# Patient Record
Sex: MALE | Race: WHITE | Employment: UNEMPLOYED | URBAN - METROPOLITAN AREA
[De-identification: names, ages, dates, MRNs, and addresses within clinical notes are randomized per-mention and may not be internally consistent; named-entity substitution may affect disease eponyms.]

---

## 2017-05-11 ENCOUNTER — HOSPITAL ENCOUNTER (EMERGENCY)
Age: 47
Discharge: HOME | End: 2017-05-12
Attending: EMERGENCY MEDICINE
Payer: COMMERCIAL

## 2017-05-11 ENCOUNTER — APPOINTMENT (OUTPATIENT)
Dept: GENERAL RADIOLOGY | Age: 47
End: 2017-05-11
Attending: EMERGENCY MEDICINE
Payer: COMMERCIAL

## 2017-05-11 DIAGNOSIS — R07.89 ATYPICAL CHEST PAIN: Primary | ICD-10-CM

## 2017-05-11 DIAGNOSIS — J42 CHRONIC BRONCHITIS, UNSPECIFIED CHRONIC BRONCHITIS TYPE (HCC): ICD-10-CM

## 2017-05-11 LAB
ANION GAP BLD CALC-SCNC: 8 MMOL/L (ref 7–16)
BASOPHILS # BLD AUTO: 0 K/UL (ref 0–0.2)
BASOPHILS # BLD: 0 % (ref 0–2)
BUN SERPL-MCNC: 12 MG/DL (ref 6–23)
CALCIUM SERPL-MCNC: 8.7 MG/DL (ref 8.3–10.4)
CHLORIDE SERPL-SCNC: 108 MMOL/L (ref 98–107)
CO2 SERPL-SCNC: 27 MMOL/L (ref 21–32)
CREAT SERPL-MCNC: 0.96 MG/DL (ref 0.8–1.5)
D DIMER PPP FEU-MCNC: 0.29 UG/ML(FEU)
DIFFERENTIAL METHOD BLD: ABNORMAL
EOSINOPHIL # BLD: 0.3 K/UL (ref 0–0.8)
EOSINOPHIL NFR BLD: 4 % (ref 0.5–7.8)
ERYTHROCYTE [DISTWIDTH] IN BLOOD BY AUTOMATED COUNT: 12.6 % (ref 11.9–14.6)
GLUCOSE SERPL-MCNC: 106 MG/DL (ref 65–100)
HCT VFR BLD AUTO: 41.5 % (ref 41.1–50.3)
HGB BLD-MCNC: 14.7 G/DL (ref 13.6–17.2)
IMM GRANULOCYTES # BLD: 0 K/UL (ref 0–0.5)
IMM GRANULOCYTES NFR BLD AUTO: 0.1 % (ref 0–5)
LYMPHOCYTES # BLD AUTO: 29 % (ref 13–44)
LYMPHOCYTES # BLD: 2.3 K/UL (ref 0.5–4.6)
MCH RBC QN AUTO: 32.2 PG (ref 26.1–32.9)
MCHC RBC AUTO-ENTMCNC: 35.4 G/DL (ref 31.4–35)
MCV RBC AUTO: 90.8 FL (ref 79.6–97.8)
MONOCYTES # BLD: 0.5 K/UL (ref 0.1–1.3)
MONOCYTES NFR BLD AUTO: 7 % (ref 4–12)
NEUTS SEG # BLD: 4.7 K/UL (ref 1.7–8.2)
NEUTS SEG NFR BLD AUTO: 60 % (ref 43–78)
PLATELET # BLD AUTO: 281 K/UL (ref 150–450)
PMV BLD AUTO: 9 FL (ref 10.8–14.1)
POTASSIUM SERPL-SCNC: 3.6 MMOL/L (ref 3.5–5.1)
RBC # BLD AUTO: 4.57 M/UL (ref 4.23–5.67)
SODIUM SERPL-SCNC: 143 MMOL/L (ref 136–145)
TROPONIN I SERPL-MCNC: <0.02 NG/ML (ref 0.02–0.05)
WBC # BLD AUTO: 7.9 K/UL (ref 4.3–11.1)

## 2017-05-11 PROCEDURE — 99285 EMERGENCY DEPT VISIT HI MDM: CPT | Performed by: EMERGENCY MEDICINE

## 2017-05-11 PROCEDURE — 80048 BASIC METABOLIC PNL TOTAL CA: CPT | Performed by: EMERGENCY MEDICINE

## 2017-05-11 PROCEDURE — 71020 XR CHEST PA LAT: CPT

## 2017-05-11 PROCEDURE — 74011250636 HC RX REV CODE- 250/636: Performed by: EMERGENCY MEDICINE

## 2017-05-11 PROCEDURE — 93005 ELECTROCARDIOGRAM TRACING: CPT | Performed by: EMERGENCY MEDICINE

## 2017-05-11 PROCEDURE — 74011250637 HC RX REV CODE- 250/637: Performed by: EMERGENCY MEDICINE

## 2017-05-11 PROCEDURE — 85025 COMPLETE CBC W/AUTO DIFF WBC: CPT | Performed by: EMERGENCY MEDICINE

## 2017-05-11 PROCEDURE — 85379 FIBRIN DEGRADATION QUANT: CPT | Performed by: EMERGENCY MEDICINE

## 2017-05-11 PROCEDURE — 84484 ASSAY OF TROPONIN QUANT: CPT | Performed by: EMERGENCY MEDICINE

## 2017-05-11 PROCEDURE — 84484 ASSAY OF TROPONIN QUANT: CPT

## 2017-05-11 PROCEDURE — 96360 HYDRATION IV INFUSION INIT: CPT | Performed by: EMERGENCY MEDICINE

## 2017-05-11 RX ORDER — GUAIFENESIN 100 MG/5ML
162 LIQUID (ML) ORAL
Status: COMPLETED | OUTPATIENT
Start: 2017-05-11 | End: 2017-05-11

## 2017-05-11 RX ORDER — NITROGLYCERIN 0.4 MG/1
0.4 TABLET SUBLINGUAL
Status: DISCONTINUED | OUTPATIENT
Start: 2017-05-11 | End: 2017-05-12 | Stop reason: HOSPADM

## 2017-05-11 RX ADMIN — SODIUM CHLORIDE 1000 ML: 900 INJECTION, SOLUTION INTRAVENOUS at 22:47

## 2017-05-11 RX ADMIN — NITROGLYCERIN 0.4 MG: 0.4 TABLET SUBLINGUAL at 23:10

## 2017-05-11 RX ADMIN — NITROGLYCERIN 0.4 MG: 0.4 TABLET SUBLINGUAL at 23:15

## 2017-05-11 RX ADMIN — ASPIRIN 81 MG 162 MG: 81 TABLET ORAL at 22:47

## 2017-05-11 NOTE — LETTER
[address] [facility] EMERGENCY DEPT One [address] [address] [phone] Work/School Note Date: 5/11/2017 To Whom It May concern: 
 
Missy Kristian was seen and treated today in the emergency room by the following provider(s): 
No providers found. Missy Townsend. Sincerely, [facility] ED

## 2017-05-12 VITALS
BODY MASS INDEX: 31.99 KG/M2 | DIASTOLIC BLOOD PRESSURE: 68 MMHG | SYSTOLIC BLOOD PRESSURE: 124 MMHG | RESPIRATION RATE: 16 BRPM | HEIGHT: 65 IN | OXYGEN SATURATION: 96 % | TEMPERATURE: 98.1 F | WEIGHT: 192 LBS | HEART RATE: 61 BPM

## 2017-05-12 LAB
ATRIAL RATE: 62 BPM
ATRIAL RATE: 63 BPM
CALCULATED P AXIS, ECG09: 27 DEGREES
CALCULATED P AXIS, ECG09: 67 DEGREES
CALCULATED R AXIS, ECG10: 19 DEGREES
CALCULATED R AXIS, ECG10: 38 DEGREES
CALCULATED T AXIS, ECG11: -3 DEGREES
CALCULATED T AXIS, ECG11: 20 DEGREES
DIAGNOSIS, 93000: NORMAL
DIAGNOSIS, 93000: NORMAL
P-R INTERVAL, ECG05: 156 MS
P-R INTERVAL, ECG05: 160 MS
Q-T INTERVAL, ECG07: 392 MS
Q-T INTERVAL, ECG07: 396 MS
QRS DURATION, ECG06: 92 MS
QRS DURATION, ECG06: 92 MS
QTC CALCULATION (BEZET), ECG08: 401 MS
QTC CALCULATION (BEZET), ECG08: 401 MS
TROPONIN I BLD-MCNC: 0 NG/ML (ref 0–0.08)
VENTRICULAR RATE, ECG03: 62 BPM
VENTRICULAR RATE, ECG03: 63 BPM

## 2017-05-12 PROCEDURE — 93005 ELECTROCARDIOGRAM TRACING: CPT | Performed by: EMERGENCY MEDICINE

## 2017-05-12 RX ORDER — CEFDINIR 300 MG/1
300 CAPSULE ORAL 2 TIMES DAILY
Qty: 14 CAP | Refills: 0 | Status: SHIPPED | OUTPATIENT
Start: 2017-05-12

## 2017-05-12 RX ORDER — NITROGLYCERIN 0.4 MG/1
TABLET SUBLINGUAL
Qty: 1 BOTTLE | Refills: 0 | Status: SHIPPED | OUTPATIENT
Start: 2017-05-12

## 2017-05-12 RX ORDER — NAPROXEN SODIUM 550 MG/1
550 TABLET ORAL 2 TIMES DAILY WITH MEALS
Qty: 20 TAB | Refills: 0 | Status: SHIPPED | OUTPATIENT
Start: 2017-05-12

## 2017-05-12 RX ORDER — BENZONATATE 200 MG/1
200 CAPSULE ORAL
Qty: 21 CAP | Refills: 0 | Status: SHIPPED | OUTPATIENT
Start: 2017-05-12 | End: 2017-05-19

## 2017-05-12 NOTE — ED NOTES
Pt complains of sharp chest pain 4 out of 10. States pain has improved in ED. Currently sitting up on cell phone at this time.

## 2017-05-12 NOTE — ED PROVIDER NOTES
HPI Comments: Patient with history of diabetes and hypertension presents with chest pain onset 3 days ago    Pain is intermittent lasting anywhere 30 minutes at a time, it is not particularly exertionally related. Chest the pain is described as a tightness radiating from the left anterior chest to the right anterior chest.  Says he sometimes feels it radiate up to his neck. No diaphoresis, but he does have some dyspnea with this. There is no nausea. Patient has nitroglycerin at home, he believes he had a stress test sometime in the past but does not remember when, with the results. There is no family history of cardiac disease. No PE or DVT risk factors, this seems to be no pleuritic component to it. Patient describes having had a upper respiratory infection/cough for a couple of months. He was seen by his lung doctor twice and placed on 2 different antibiotics, the name of neither of which can he recall. The second seemed to help briefly, but then the cough came right back. Patient is a nonsmoker. Patient is a 55 y.o. male presenting with chest pain. The history is provided by the patient. Chest Pain    This is a new problem. The current episode started more than 2 days ago. The problem has not changed since onset. Episode Length: 15-30. The problem occurs daily. The pain is associated with normal activity (more so at rest than with activity). Associated symptoms include cough and shortness of breath. Pertinent negatives include no abdominal pain, no back pain, no dizziness, no fever, no headaches, no nausea, no palpitations and no vomiting.         Past Medical History:   Diagnosis Date    Allergic rhinitis     Bipolar disorder ([facility])     Chronic back pain     Chronic obstructive pulmonary disease ([facility] [address]     Depression with anxiety     DM2 (diabetes mellitus, type 2) (HCC)     GERD (gastroesophageal reflux disease)     Hemorrhoids     HLD (hyperlipidemia)     HTN (hypertension)     Insomnia 10/15/2012    Neuropathy     ЕЛЕНА (obstructive sleep apnea) 10/15/2012    CPAP at [facility]    Psychiatric disorder     RLS (restless legs syndrome) 10/15/2012       Past Surgical History:   Procedure Laterality Date    HX APPENDECTOMY      HX HERNIA REPAIR  2000    HX VASECTOMY           Family History:   Problem Relation Age of Onset    Hypertension Mother     High Cholesterol Mother     Diabetes Mother     Depression Mother     Hypertension Father     High Cholesterol Father     Diabetes Father        Social History     Social History    Marital status:      Spouse name: N/A    Number of children: N/A    Years of education: N/A     Occupational History          part time 30 hours per week     Social History Main Topics    Smoking status: Former Smoker     Packs/day: 1.50     Years: 18.00     Types: Cigars    Smokeless tobacco: Former User    Alcohol use No    Drug use: No      Comment: in the past    Sexual activity: Not on file     Other Topics Concern    Caffeine Concern Not Asked     4 cups per day    Exercise Yes     3-5 days per week     Social History Narrative         ALLERGIES: Lisinopril    Review of Systems   Constitutional: Negative for chills and fever. HENT: Negative for rhinorrhea and sore throat. Eyes: Negative for discharge and redness. Respiratory: Positive for cough and shortness of breath. Cardiovascular: Positive for chest pain. Negative for palpitations and leg swelling. Gastrointestinal: Negative for abdominal pain, nausea and vomiting. Musculoskeletal: Positive for arthralgias and myalgias. Negative for back pain. Skin: Negative for rash. Neurological: Negative for dizziness and headaches. All other systems reviewed and are negative.       Vitals:    05/11/17 2035 05/11/17 2224 05/11/17 2257   BP: 119/68 119/57 119/71   Pulse: 68 65 64   Resp: 22 16 18   Temp: 97.5 °F (36.4 °C)     SpO2: 97% 98% 96%   Weight: 87.1 kg (192 lb) Height: 5' 5\" (1.651 m)              Physical Exam   Constitutional: He is oriented to person, place, and time. He appears well-developed and well-nourished. No distress. HENT:   Head: Normocephalic and atraumatic. Eyes: Conjunctivae are normal. Pupils are equal, round, and reactive to light. Right eye exhibits no discharge. Left eye exhibits no discharge. No scleral icterus. Neck: Normal range of motion. Neck supple. Cardiovascular: Normal rate, regular rhythm and normal heart sounds. Exam reveals no gallop. No murmur heard. Pulmonary/Chest: Effort normal and breath sounds normal. No respiratory distress. He has no wheezes. He has no rales. He exhibits no tenderness. Abdominal: Soft. There is no tenderness. There is no guarding. Musculoskeletal: Normal range of motion. He exhibits no edema. Neurological: He is alert and oriented to person, place, and time. He exhibits normal muscle tone. cni 2-12 grossly   Skin: Skin is warm and dry. He is not diaphoretic. Psychiatric: He has a normal mood and affect. His behavior is normal.   Nursing note and vitals reviewed. MDM  Number of Diagnoses or Management Options  Diagnosis management comments: Medical decision making note:  Atypical chest pain and EKG with chronically inverted T-wave in lead III,   now with a newly inverted T-wave in aVF. Chest x-ray and initial set of enzymes negative. We'll check a d-dimer, and give a trial of nitroglycerin. I think the chest pain is probably more related to the cough/URI he's had last 2 months. This concludes the \"medical decision making note\" part of this emergency department visit note.       ED Course       Procedures

## 2017-05-12 NOTE — ED TRIAGE NOTES
Pt states he has been having chest pain since Tuesday, states he has been feeling sob and coughing productive cough clear in color

## 2017-07-03 ENCOUNTER — APPOINTMENT (OUTPATIENT)
Dept: GENERAL RADIOLOGY | Age: 47
End: 2017-07-03
Attending: EMERGENCY MEDICINE
Payer: COMMERCIAL

## 2017-07-03 ENCOUNTER — HOSPITAL ENCOUNTER (EMERGENCY)
Age: 47
Discharge: HOME | End: 2017-07-03
Attending: EMERGENCY MEDICINE
Payer: COMMERCIAL

## 2017-07-03 VITALS
BODY MASS INDEX: 31.65 KG/M2 | HEIGHT: 65 IN | DIASTOLIC BLOOD PRESSURE: 55 MMHG | HEART RATE: 80 BPM | RESPIRATION RATE: 17 BRPM | SYSTOLIC BLOOD PRESSURE: 106 MMHG | TEMPERATURE: 98.3 F | WEIGHT: 190 LBS | OXYGEN SATURATION: 93 %

## 2017-07-03 DIAGNOSIS — M62.838 NIGHT MUSCLE SPASMS: Primary | ICD-10-CM

## 2017-07-03 DIAGNOSIS — R53.83 MALAISE AND FATIGUE: ICD-10-CM

## 2017-07-03 DIAGNOSIS — R53.81 MALAISE AND FATIGUE: ICD-10-CM

## 2017-07-03 LAB
ALBUMIN SERPL BCP-MCNC: 3.3 G/DL (ref 3.5–5)
ALBUMIN/GLOB SERPL: 1 {RATIO} (ref 1.2–3.5)
ALP SERPL-CCNC: 60 U/L (ref 50–136)
ALT SERPL-CCNC: 23 U/L (ref 12–65)
ANION GAP BLD CALC-SCNC: 8 MMOL/L (ref 7–16)
AST SERPL W P-5'-P-CCNC: 19 U/L (ref 15–37)
ATRIAL RATE: 79 BPM
BASOPHILS # BLD AUTO: 0 K/UL (ref 0–0.2)
BASOPHILS # BLD: 0 % (ref 0–2)
BILIRUB DIRECT SERPL-MCNC: <0.1 MG/DL
BILIRUB SERPL-MCNC: 0.3 MG/DL (ref 0.2–1.1)
BUN SERPL-MCNC: 15 MG/DL (ref 6–23)
CALCIUM SERPL-MCNC: 8.6 MG/DL (ref 8.3–10.4)
CALCULATED P AXIS, ECG09: 46 DEGREES
CALCULATED R AXIS, ECG10: 19 DEGREES
CALCULATED T AXIS, ECG11: 24 DEGREES
CHLORIDE SERPL-SCNC: 108 MMOL/L (ref 98–107)
CO2 SERPL-SCNC: 27 MMOL/L (ref 21–32)
CREAT SERPL-MCNC: 1 MG/DL (ref 0.8–1.5)
DIAGNOSIS, 93000: NORMAL
DIFFERENTIAL METHOD BLD: ABNORMAL
EOSINOPHIL # BLD: 0.4 K/UL (ref 0–0.8)
EOSINOPHIL NFR BLD: 3 % (ref 0.5–7.8)
ERYTHROCYTE [DISTWIDTH] IN BLOOD BY AUTOMATED COUNT: 12.7 % (ref 11.9–14.6)
GLOBULIN SER CALC-MCNC: 3.4 G/DL (ref 2.3–3.5)
GLUCOSE SERPL-MCNC: 153 MG/DL (ref 65–100)
HCT VFR BLD AUTO: 42.6 % (ref 41.1–50.3)
HGB BLD-MCNC: 15 G/DL (ref 13.6–17.2)
IMM GRANULOCYTES # BLD: 0 K/UL (ref 0–0.5)
IMM GRANULOCYTES NFR BLD AUTO: 0.3 % (ref 0–5)
LYMPHOCYTES # BLD AUTO: 21 % (ref 13–44)
LYMPHOCYTES # BLD: 3 K/UL (ref 0.5–4.6)
MAGNESIUM SERPL-MCNC: 1.8 MG/DL (ref 1.8–2.4)
MCH RBC QN AUTO: 31.9 PG (ref 26.1–32.9)
MCHC RBC AUTO-ENTMCNC: 35.2 G/DL (ref 31.4–35)
MCV RBC AUTO: 90.6 FL (ref 79.6–97.8)
MONOCYTES # BLD: 0.6 K/UL (ref 0.1–1.3)
MONOCYTES NFR BLD AUTO: 4 % (ref 4–12)
NEUTS SEG # BLD: 10.1 K/UL (ref 1.7–8.2)
NEUTS SEG NFR BLD AUTO: 72 % (ref 43–78)
P-R INTERVAL, ECG05: 150 MS
PHOSPHATE SERPL-MCNC: 3.2 MG/DL (ref 2.5–4.5)
PLATELET # BLD AUTO: 277 K/UL (ref 150–450)
PMV BLD AUTO: 8.7 FL (ref 10.8–14.1)
POTASSIUM SERPL-SCNC: 3.5 MMOL/L (ref 3.5–5.1)
PROT SERPL-MCNC: 6.7 G/DL (ref 6.3–8.2)
Q-T INTERVAL, ECG07: 374 MS
QRS DURATION, ECG06: 96 MS
QTC CALCULATION (BEZET), ECG08: 428 MS
RBC # BLD AUTO: 4.7 M/UL (ref 4.23–5.67)
SODIUM SERPL-SCNC: 143 MMOL/L (ref 136–145)
TROPONIN I SERPL-MCNC: <0.02 NG/ML (ref 0.02–0.05)
VENTRICULAR RATE, ECG03: 79 BPM
WBC # BLD AUTO: 14.1 K/UL (ref 4.3–11.1)

## 2017-07-03 PROCEDURE — 84100 ASSAY OF PHOSPHORUS: CPT | Performed by: EMERGENCY MEDICINE

## 2017-07-03 PROCEDURE — 83735 ASSAY OF MAGNESIUM: CPT | Performed by: EMERGENCY MEDICINE

## 2017-07-03 PROCEDURE — 80076 HEPATIC FUNCTION PANEL: CPT | Performed by: EMERGENCY MEDICINE

## 2017-07-03 PROCEDURE — 84484 ASSAY OF TROPONIN QUANT: CPT | Performed by: EMERGENCY MEDICINE

## 2017-07-03 PROCEDURE — 93005 ELECTROCARDIOGRAM TRACING: CPT | Performed by: EMERGENCY MEDICINE

## 2017-07-03 PROCEDURE — 71020 XR CHEST PA LAT: CPT

## 2017-07-03 PROCEDURE — 85025 COMPLETE CBC W/AUTO DIFF WBC: CPT | Performed by: EMERGENCY MEDICINE

## 2017-07-03 PROCEDURE — 80048 BASIC METABOLIC PNL TOTAL CA: CPT | Performed by: EMERGENCY MEDICINE

## 2017-07-03 PROCEDURE — 99284 EMERGENCY DEPT VISIT MOD MDM: CPT | Performed by: EMERGENCY MEDICINE

## 2017-07-03 RX ORDER — METHOCARBAMOL 750 MG/1
1500 TABLET, FILM COATED ORAL 4 TIMES DAILY
Qty: 40 TAB | Refills: 0 | Status: SHIPPED | OUTPATIENT
Start: 2017-07-03

## 2017-07-03 NOTE — ED PROVIDER NOTES
HPI Comments: 79-year-old male presents with onset of chest and neck spasms. Patient states that he awoke early this morning, was walking to the restroom when he felt tightening and spasms. There is chest and into his neck. Spouse states that  He had visible spasming contortions in his neck region. The patient has not had any new medications. Blood sugar was normal.  He had no fever, vomiting. Spouse did raise some concern about the schedule that the patient keeps that he doesn't get enough rest and has been putting intoo many hours at work. Patient is a 55 y.o. male presenting with chest pain and muscle spasms. The history is provided by the patient and the spouse. Chest Pain    This is a new problem. The current episode started 1 to 2 hours ago. The problem has been resolved. The problem occurs constantly. The pain is associated with normal activity. Pain location: diffuse. The pain is moderate. The quality of the pain is described as tightness. The pain does not radiate. Associated symptoms include malaise/fatigue and nausea. Pertinent negatives include no abdominal pain, no back pain, no cough, no diaphoresis, no dizziness, no fever, no headaches, no leg pain, no numbness, no palpitations, no shortness of breath and no vomiting. He has tried nothing for the symptoms. Risk factors include family history, obesity and diabetes mellitus. Pertinent negatives include no cardiac catheterization. Spasms    Associated symptoms include chest pain. Pertinent negatives include no fever, no numbness, no headaches, no abdominal pain, no dysuria and no leg pain.         Past Medical History:   Diagnosis Date    Allergic rhinitis     [facility] disorder ([facility])     Chronic back pain     Chronic obstructive pulmonary disease ([facility] [address]     Depression with anxiety     DM2 (diabetes mellitus, type 2) (HCC)     GERD (gastroesophageal reflux disease)     Hemorrhoids     HLD (hyperlipidemia)     HTN (hypertension)     Insomnia 10/15/2012    Neuropathy     ЕЛЕНА (obstructive sleep apnea) 10/15/2012    CPAP at     Psychiatric disorder     RLS (restless legs syndrome) 10/15/2012       Past Surgical History:   Procedure Laterality Date    HX APPENDECTOMY      HX HERNIA REPAIR  2000    HX VASECTOMY           Family History:   Problem Relation Age of Onset    Hypertension Mother     High Cholesterol Mother     Diabetes Mother     Depression Mother     Hypertension Father     High Cholesterol Father     Diabetes Father        Social History     Social History    Marital status:      Spouse name: N/A    Number of children: N/A    Years of education: N/A     Occupational History          part time 30 hours per week     Social History Main Topics    Smoking status: Former Smoker     Packs/day: 1.50     Years: 18.00     Types: Cigars    Smokeless tobacco: Former User    Alcohol use No    Drug use: No      Comment: in the past    Sexual activity: Not on file     Other Topics Concern    Caffeine Concern Not Asked     4 cups per day    Exercise Yes     3-5 days per week     Social History Narrative         ALLERGIES: Lisinopril    Review of Systems   Constitutional: Positive for malaise/fatigue. Negative for activity change, chills, diaphoresis and fever. HENT: Negative for dental problem, hearing loss, nosebleeds, rhinorrhea and sore throat. Eyes: Negative for pain, discharge, redness and visual disturbance. Respiratory: Negative for cough, chest tightness and shortness of breath. Cardiovascular: Positive for chest pain. Negative for palpitations and leg swelling. Gastrointestinal: Positive for nausea. Negative for abdominal pain, constipation, diarrhea and vomiting. Endocrine: Negative for cold intolerance, heat intolerance, polydipsia and polyuria. Genitourinary: Negative for dysuria and flank pain. Musculoskeletal: Positive for muscle spasms.  Negative for arthralgias, back pain, joint swelling, myalgias and neck pain. Skin: Negative for pallor and rash. Allergic/Immunologic: Negative for environmental allergies and food allergies. Neurological: Negative for dizziness, tremors, light-headedness, numbness and headaches. Hematological: Negative for adenopathy. Does not bruise/bleed easily. Psychiatric/Behavioral: Negative for confusion and dysphoric mood. The patient is not nervous/anxious and is not hyperactive. All other systems reviewed and are negative. Vitals:    07/03/17 0305   BP: 106/55   Pulse: 80   Resp: 17   Temp: 98.3 °F (36.8 °C)   SpO2: 93%   Weight: 86.2 kg (190 lb)   Height: 5' 5\" (1.651 m)            Physical Exam   Constitutional: He is oriented to person, place, and time. He appears well-developed and well-nourished. He appears distressed. HENT:   Head: Normocephalic and atraumatic. Mouth/Throat: Oropharynx is clear and moist.   Eyes: Conjunctivae and EOM are normal. Pupils are equal, round, and reactive to light. Right eye exhibits no discharge. Left eye exhibits no discharge. No scleral icterus. Neck: Normal range of motion. Neck supple. No JVD present. Cardiovascular: Normal rate, regular rhythm, normal heart sounds and intact distal pulses. Exam reveals no gallop and no friction rub. No murmur heard. Pulmonary/Chest: Effort normal and breath sounds normal. No respiratory distress. He has no wheezes. Abdominal: Soft. Bowel sounds are normal. He exhibits no distension. There is no hepatosplenomegaly. There is no tenderness. There is no rebound and no guarding. Musculoskeletal: Normal range of motion. He exhibits no edema or tenderness. Lymphadenopathy:     He has no cervical adenopathy. Neurological: He is alert and oriented to person, place, and time. He has normal strength. No cranial nerve deficit or sensory deficit. He exhibits normal muscle tone. GCS eye subscore is 4. GCS verbal subscore is 5. GCS motor subscore is 6.    Skin: Skin is warm and dry. No rash noted. He is not diaphoretic. No erythema. Psychiatric: He has a normal mood and affect. His speech is normal and behavior is normal. Judgment and thought content normal. Cognition and memory are normal.   Patient sleepy but easily arousable   Nursing note and vitals reviewed. MDM  Number of Diagnoses or Management Options  Malaise and fatigue: new and requires workup  Night muscle spasms: new and requires workup  Diagnosis management comments: EKG sinus rhythm, normal intervals and axis, no ectopy, no acute ischemic changes  Blood work and chest x-ray are normal as well. Patient's symptomatology is not consistent with a cardiac event. Will treat for muscle spasms have encouraged healthy. lifestyle choices including increasing fluids and more sleep. Amount and/or Complexity of Data Reviewed  Clinical lab tests: ordered and reviewed  Tests in the radiology section of CPT®: ordered and reviewed  Obtain history from someone other than the patient: yes  Review and summarize past medical records: yes  Independent visualization of images, tracings, or specimens: yes    Risk of Complications, Morbidity, and/or Mortality  Presenting problems: high  Diagnostic procedures: moderate  Management options: moderate  General comments: Elements of this note have been dictated via voice recognition software. Text and phrases may be limited by the accuracy of the software. The chart has been reviewed, but errors may still be present.       Patient Progress  Patient progress: improved    ED Course       Procedures

## 2017-07-03 NOTE — ED TRIAGE NOTES
Patient reports he woke up tonight to use the restroom and states his muscles started tensing up--patient reports he feels \"a lot better than I was\" while in triage--when asked about pain patient states c/o chest pressure

## 2017-07-03 NOTE — DISCHARGE INSTRUCTIONS
Fatigue: Care Instructions  Your Care Instructions  Fatigue is a feeling of tiredness, exhaustion, or lack of energy. You may feel fatigue because of too much or not enough activity. It can also come from stress, lack of sleep, boredom, and poor diet. Many medical problems, such as viral infections, can cause fatigue. Emotional problems, especially depression, are often the cause of fatigue. Fatigue is most often a symptom of another problem. Treatment for fatigue depends on the cause. For example, if you have fatigue because you have a certain health problem, treating this problem also treats your fatigue. If depression or anxiety is the cause, treatment may help. Follow-up care is a key part of your treatment and safety. Be sure to make and go to all appointments, and call your doctor if you are having problems. It's also a good idea to know your test results and keep a list of the medicines you take. How can you care for yourself at home? · Get regular exercise. But don't overdo it. Go back and forth between rest and exercise. · Get plenty of rest.  · Eat a healthy diet. Do not skip meals, especially breakfast.  · Reduce your use of caffeine, tobacco, and alcohol. Caffeine is most often found in coffee, tea, cola drinks, and chocolate. · Limit medicines that can cause fatigue. This includes tranquilizers and cold and allergy medicines. When should you call for help? Watch closely for changes in your health, and be sure to contact your doctor if:  · You have new symptoms such as fever or a rash. · Your fatigue gets worse. · You have been feeling down, depressed, or hopeless. Or you may have lost interest in things that you usually enjoy. · You are not getting better as expected. Where can you learn more? Go to http://mallory-mikayla.info/. Enter N258 in the search box to learn more about \"Fatigue: Care Instructions. \"  Current as of: March 20, 2017  Content Version: 11.3  © 7630-9851 [employer]. Care instructions adapted under license by [employer] (which disclaims liability or warranty for this information). If you have questions about a medical condition or this instruction, always ask your healthcare professional. [place] [address] any warranty or liability for your use of this information.

## 2017-07-03 NOTE — LETTER
[address] [facility] EMERGENCY DEPT One [address] [address] 
[phone] Work/School Note Date: 7/3/2017 To Whom It May concern: 
 
Khris Elder was seen and treated today in the emergency room by the following provider(s): 
Attending Provider: Maryl Severe, MD.   
 
Khris Elder may return to work on 7/5/17. Sincerely, Moise Hays RN

## 2017-07-03 NOTE — ED TRIAGE NOTES
Patient to ed via ems from home with c/o severe muscle spasm--patient reports symptoms lasted approx 5 minutes--ems bgl [id number]